# Patient Record
Sex: MALE | Race: BLACK OR AFRICAN AMERICAN | NOT HISPANIC OR LATINO | Employment: FULL TIME | URBAN - METROPOLITAN AREA
[De-identification: names, ages, dates, MRNs, and addresses within clinical notes are randomized per-mention and may not be internally consistent; named-entity substitution may affect disease eponyms.]

---

## 2022-12-14 DIAGNOSIS — Z00.00 ROUTINE GENERAL MEDICAL EXAMINATION AT A HEALTH CARE FACILITY: Primary | ICD-10-CM

## 2022-12-27 ENCOUNTER — HOSPITAL ENCOUNTER (OUTPATIENT)
Dept: RADIOLOGY | Facility: HOSPITAL | Age: 47
Discharge: HOME | End: 2022-12-27
Attending: INTERNAL MEDICINE
Payer: COMMERCIAL

## 2022-12-27 ENCOUNTER — CLINICAL SUPPORT (OUTPATIENT)
Dept: INTERNAL MEDICINE | Facility: CLINIC | Age: 47
End: 2022-12-27
Payer: COMMERCIAL

## 2022-12-27 ENCOUNTER — OFFICE VISIT (OUTPATIENT)
Dept: INTERNAL MEDICINE | Facility: CLINIC | Age: 47
End: 2022-12-27
Payer: COMMERCIAL

## 2022-12-27 ENCOUNTER — HOSPITAL ENCOUNTER (OUTPATIENT)
Dept: CARDIOLOGY | Facility: HOSPITAL | Age: 47
Discharge: HOME | End: 2022-12-27
Attending: INTERNAL MEDICINE
Payer: COMMERCIAL

## 2022-12-27 VITALS
SYSTOLIC BLOOD PRESSURE: 154 MMHG | HEART RATE: 67 BPM | WEIGHT: 206 LBS | HEIGHT: 68 IN | WEIGHT: 208 LBS | DIASTOLIC BLOOD PRESSURE: 97 MMHG | HEIGHT: 68 IN | BODY MASS INDEX: 31.52 KG/M2 | BODY MASS INDEX: 31.22 KG/M2

## 2022-12-27 DIAGNOSIS — Z00.00 ROUTINE GENERAL MEDICAL EXAMINATION AT A HEALTH CARE FACILITY: Primary | ICD-10-CM

## 2022-12-27 DIAGNOSIS — I10 ESSENTIAL HYPERTENSION: ICD-10-CM

## 2022-12-27 DIAGNOSIS — K44.9 HIATAL HERNIA WITH GERD: ICD-10-CM

## 2022-12-27 DIAGNOSIS — K21.9 HIATAL HERNIA WITH GERD: ICD-10-CM

## 2022-12-27 DIAGNOSIS — E78.5 HYPERLIPIDEMIA LDL GOAL <130: ICD-10-CM

## 2022-12-27 DIAGNOSIS — Z00.00 ROUTINE GENERAL MEDICAL EXAMINATION AT A HEALTH CARE FACILITY: ICD-10-CM

## 2022-12-27 DIAGNOSIS — R73.09 ELEVATED HEMOGLOBIN A1C: ICD-10-CM

## 2022-12-27 LAB
ALBUMIN SERPL BCP-MCNC: 3.9 G/DL (ref 3.5–5.2)
ALP SERPL-CCNC: 55 U/L (ref 55–135)
ALT SERPL W/O P-5'-P-CCNC: 40 U/L (ref 10–44)
ANION GAP SERPL CALC-SCNC: 13 MMOL/L (ref 8–16)
AST SERPL-CCNC: 38 U/L (ref 10–40)
BILIRUB SERPL-MCNC: 0.4 MG/DL (ref 0.1–1)
BUN SERPL-MCNC: 11 MG/DL (ref 6–20)
CALCIUM SERPL-MCNC: 9.5 MG/DL (ref 8.7–10.5)
CHLORIDE SERPL-SCNC: 107 MMOL/L (ref 95–110)
CHOLEST SERPL-MCNC: 191 MG/DL (ref 120–199)
CHOLEST/HDLC SERPL: 3.3 {RATIO} (ref 2–5)
CO2 SERPL-SCNC: 24 MMOL/L (ref 23–29)
COMPLEXED PSA SERPL-MCNC: 0.96 NG/ML (ref 0–4)
CREAT SERPL-MCNC: 0.9 MG/DL (ref 0.5–1.4)
CV STRESS BASE HR: 66 BPM
DIASTOLIC BLOOD PRESSURE: 80 MMHG
ERYTHROCYTE [DISTWIDTH] IN BLOOD BY AUTOMATED COUNT: 14.2 % (ref 11.5–14.5)
EST. GFR  (NO RACE VARIABLE): >60 ML/MIN/1.73 M^2
ESTIMATED AVG GLUCOSE: 123 MG/DL (ref 68–131)
GLUCOSE SERPL-MCNC: 102 MG/DL (ref 70–110)
HBA1C MFR BLD: 5.9 % (ref 4–5.6)
HCT VFR BLD AUTO: 42.9 % (ref 40–54)
HDLC SERPL-MCNC: 58 MG/DL (ref 40–75)
HDLC SERPL: 30.4 % (ref 20–50)
HGB BLD-MCNC: 13.3 G/DL (ref 14–18)
HIV 1+2 AB+HIV1 P24 AG SERPL QL IA: NORMAL
LDLC SERPL CALC-MCNC: 122.6 MG/DL (ref 63–159)
MCH RBC QN AUTO: 26.3 PG (ref 27–31)
MCHC RBC AUTO-ENTMCNC: 31 G/DL (ref 32–36)
MCV RBC AUTO: 85 FL (ref 82–98)
NONHDLC SERPL-MCNC: 133 MG/DL
OHS CV CPX 1 MINUTE RECOVERY HEART RATE: 137 BPM
OHS CV CPX 85 PERCENT MAX PREDICTED HEART RATE MALE: 149
OHS CV CPX ESTIMATED METS: 15
OHS CV CPX MAX PREDICTED HEART RATE: 175
OHS CV CPX PATIENT IS FEMALE: 0
OHS CV CPX PATIENT IS MALE: 1
OHS CV CPX PEAK DIASTOLIC BLOOD PRESSURE: 86 MMHG
OHS CV CPX PEAK HEAR RATE: 166 BPM
OHS CV CPX PEAK RATE PRESSURE PRODUCT: NORMAL
OHS CV CPX PEAK SYSTOLIC BLOOD PRESSURE: 193 MMHG
OHS CV CPX PERCENT MAX PREDICTED HEART RATE ACHIEVED: 95
OHS CV CPX RATE PRESSURE PRODUCT PRESENTING: 8844
PLATELET # BLD AUTO: 207 K/UL (ref 150–450)
PMV BLD AUTO: 10.3 FL (ref 9.2–12.9)
POTASSIUM SERPL-SCNC: 4 MMOL/L (ref 3.5–5.1)
PROT SERPL-MCNC: 7.3 G/DL (ref 6–8.4)
RBC # BLD AUTO: 5.05 M/UL (ref 4.6–6.2)
SODIUM SERPL-SCNC: 144 MMOL/L (ref 136–145)
STRESS ECHO POST EXERCISE DUR MIN: 8 MINUTES
STRESS ECHO POST EXERCISE DUR SEC: 52 SECONDS
SYSTOLIC BLOOD PRESSURE: 134 MMHG
TRIGL SERPL-MCNC: 52 MG/DL (ref 30–150)
TSH SERPL DL<=0.005 MIU/L-ACNC: 0.86 UIU/ML (ref 0.4–4)
WBC # BLD AUTO: 2.93 K/UL (ref 3.9–12.7)

## 2022-12-27 PROCEDURE — 4010F ACE/ARB THERAPY RXD/TAKEN: CPT | Mod: CPTII,S$GLB,, | Performed by: INTERNAL MEDICINE

## 2022-12-27 PROCEDURE — 80061 LIPID PANEL: CPT | Performed by: INTERNAL MEDICINE

## 2022-12-27 PROCEDURE — 99999 PR PBB SHADOW E&M-EST. PATIENT-LVL III: ICD-10-PCS | Mod: PBBFAC,,, | Performed by: INTERNAL MEDICINE

## 2022-12-27 PROCEDURE — 99999 PR PBB SHADOW E&M-EST. PATIENT-LVL I: CPT | Mod: PBBFAC,,,

## 2022-12-27 PROCEDURE — 3044F PR MOST RECENT HEMOGLOBIN A1C LEVEL <7.0%: ICD-10-PCS | Mod: CPTII,S$GLB,, | Performed by: INTERNAL MEDICINE

## 2022-12-27 PROCEDURE — 80053 COMPREHEN METABOLIC PANEL: CPT | Performed by: INTERNAL MEDICINE

## 2022-12-27 PROCEDURE — 99386 PREV VISIT NEW AGE 40-64: CPT | Mod: S$GLB,,, | Performed by: INTERNAL MEDICINE

## 2022-12-27 PROCEDURE — 99999 PR PBB SHADOW E&M-EST. PATIENT-LVL I: ICD-10-PCS | Mod: PBBFAC,,,

## 2022-12-27 PROCEDURE — 71046 X-RAY EXAM CHEST 2 VIEWS: CPT | Mod: 26,,, | Performed by: RADIOLOGY

## 2022-12-27 PROCEDURE — 84443 ASSAY THYROID STIM HORMONE: CPT | Performed by: INTERNAL MEDICINE

## 2022-12-27 PROCEDURE — 71046 XR CHEST PA AND LATERAL: ICD-10-PCS | Mod: 26,,, | Performed by: RADIOLOGY

## 2022-12-27 PROCEDURE — 99211 PR NURSE VISIT, 15 MINS, EXEC HLTH ONLY: ICD-10-PCS | Mod: S$GLB,,, | Performed by: INTERNAL MEDICINE

## 2022-12-27 PROCEDURE — 83036 HEMOGLOBIN GLYCOSYLATED A1C: CPT | Performed by: INTERNAL MEDICINE

## 2022-12-27 PROCEDURE — 93018 EXERCISE STRESS - EKG (CUPID ONLY): ICD-10-PCS | Mod: ,,, | Performed by: INTERNAL MEDICINE

## 2022-12-27 PROCEDURE — 3077F PR MOST RECENT SYSTOLIC BLOOD PRESSURE >= 140 MM HG: ICD-10-PCS | Mod: CPTII,S$GLB,, | Performed by: INTERNAL MEDICINE

## 2022-12-27 PROCEDURE — 3080F DIAST BP >= 90 MM HG: CPT | Mod: CPTII,S$GLB,, | Performed by: INTERNAL MEDICINE

## 2022-12-27 PROCEDURE — 97802 PR MED NUTR THER, 1ST, INDIV, EA 15 MIN: ICD-10-PCS | Mod: S$GLB,,, | Performed by: INTERNAL MEDICINE

## 2022-12-27 PROCEDURE — 97750 PR PHYSICAL PERFORMANCE TEST: ICD-10-PCS | Mod: S$GLB,,, | Performed by: INTERNAL MEDICINE

## 2022-12-27 PROCEDURE — 93016 EXERCISE STRESS - EKG (CUPID ONLY): ICD-10-PCS | Mod: ,,, | Performed by: INTERNAL MEDICINE

## 2022-12-27 PROCEDURE — 87389 HIV-1 AG W/HIV-1&-2 AB AG IA: CPT | Performed by: INTERNAL MEDICINE

## 2022-12-27 PROCEDURE — 71046 X-RAY EXAM CHEST 2 VIEWS: CPT | Mod: TC,FY

## 2022-12-27 PROCEDURE — 93018 CV STRESS TEST I&R ONLY: CPT | Mod: ,,, | Performed by: INTERNAL MEDICINE

## 2022-12-27 PROCEDURE — 1159F PR MEDICATION LIST DOCUMENTED IN MEDICAL RECORD: ICD-10-PCS | Mod: CPTII,S$GLB,, | Performed by: INTERNAL MEDICINE

## 2022-12-27 PROCEDURE — 84153 ASSAY OF PSA TOTAL: CPT | Performed by: INTERNAL MEDICINE

## 2022-12-27 PROCEDURE — 3044F HG A1C LEVEL LT 7.0%: CPT | Mod: CPTII,S$GLB,, | Performed by: INTERNAL MEDICINE

## 2022-12-27 PROCEDURE — 93016 CV STRESS TEST SUPVJ ONLY: CPT | Mod: ,,, | Performed by: INTERNAL MEDICINE

## 2022-12-27 PROCEDURE — 97802 MEDICAL NUTRITION INDIV IN: CPT | Mod: S$GLB,,, | Performed by: INTERNAL MEDICINE

## 2022-12-27 PROCEDURE — 99999 PR PBB SHADOW E&M-EST. PATIENT-LVL III: CPT | Mod: PBBFAC,,, | Performed by: INTERNAL MEDICINE

## 2022-12-27 PROCEDURE — 3077F SYST BP >= 140 MM HG: CPT | Mod: CPTII,S$GLB,, | Performed by: INTERNAL MEDICINE

## 2022-12-27 PROCEDURE — 99211 OFF/OP EST MAY X REQ PHY/QHP: CPT | Mod: S$GLB,,, | Performed by: INTERNAL MEDICINE

## 2022-12-27 PROCEDURE — 4010F PR ACE/ARB THEARPY RXD/TAKEN: ICD-10-PCS | Mod: CPTII,S$GLB,, | Performed by: INTERNAL MEDICINE

## 2022-12-27 PROCEDURE — 99386 PR PREVENTIVE VISIT,NEW,40-64: ICD-10-PCS | Mod: S$GLB,,, | Performed by: INTERNAL MEDICINE

## 2022-12-27 PROCEDURE — 3080F PR MOST RECENT DIASTOLIC BLOOD PRESSURE >= 90 MM HG: ICD-10-PCS | Mod: CPTII,S$GLB,, | Performed by: INTERNAL MEDICINE

## 2022-12-27 PROCEDURE — 85027 COMPLETE CBC AUTOMATED: CPT | Performed by: INTERNAL MEDICINE

## 2022-12-27 PROCEDURE — 1159F MED LIST DOCD IN RCRD: CPT | Mod: CPTII,S$GLB,, | Performed by: INTERNAL MEDICINE

## 2022-12-27 PROCEDURE — 97750 PHYSICAL PERFORMANCE TEST: CPT | Mod: S$GLB,,, | Performed by: INTERNAL MEDICINE

## 2022-12-27 PROCEDURE — 3008F PR BODY MASS INDEX (BMI) DOCUMENTED: ICD-10-PCS | Mod: CPTII,S$GLB,, | Performed by: INTERNAL MEDICINE

## 2022-12-27 PROCEDURE — 93017 CV STRESS TEST TRACING ONLY: CPT

## 2022-12-27 PROCEDURE — 3008F BODY MASS INDEX DOCD: CPT | Mod: CPTII,S$GLB,, | Performed by: INTERNAL MEDICINE

## 2022-12-27 RX ORDER — OMEPRAZOLE AND SODIUM BICARBONATE 40; 1100 MG/1; MG/1
1 CAPSULE ORAL DAILY
COMMUNITY
Start: 2012-08-25

## 2022-12-27 RX ORDER — LOSARTAN POTASSIUM 25 MG/1
25 TABLET ORAL DAILY
Qty: 90 TABLET | Refills: 3 | Status: SHIPPED | OUTPATIENT
Start: 2022-12-27 | End: 2024-03-05

## 2022-12-27 RX ORDER — MULTIVITAMIN
1 TABLET ORAL DAILY
COMMUNITY

## 2022-12-27 RX ORDER — LEVOCETIRIZINE DIHYDROCHLORIDE 5 MG/1
5 TABLET, FILM COATED ORAL
COMMUNITY
Start: 2022-04-25 | End: 2023-12-21

## 2022-12-27 RX ORDER — CHOLECALCIFEROL (VITAMIN D3) 25 MCG
2000 TABLET ORAL
COMMUNITY

## 2022-12-27 NOTE — PROGRESS NOTES
"Nutrition Assessment  Session Time:  60 minutes      Client name:  Amador Brown  :  1975  Age:  46 y.o.  Gender:  male    Client states:  Very pleasant gentleman here for his initial Executive Health physical.   with two sons, ages 19 and 13.  Resides in [place] although awaiting new home to be completed.  Has a stated hx of hiatal hernia, HTN, and GERD in addition to FH of DM (father), pancreatic CA (mother), HTN, and stroke.  Dines out often due to nature of job and children's extracurricular activities.  Limits intake of sugary beverages, drinking mostly coffee, water, and unsweet tea.  Weight typically fluctuates +/- 10# and is slightly higher today due to holiday indulgences.  Maintains an active lifestyle, performing 2-5 miles on treadmill almost daily.  Admits, however, that frequency has been somewhat inconsistent at times due to temporary living situation (apartment).  With job, this is his fifth move and finds it difficult to maintain health habits during such interim periods.  Typically consumes three meals daily although inquired about healthy snacking as he finds himself grazing in the afternoons, sometimes in lieu of eating dinner.  Selects mostly lean protein sources.  Is aware of dietary triggers for GERD and so, limits intake of acidic foods in particular.  Was previously advised to consume 6 small meals/snacks daily to ease symptoms although unsure of practicality of such.  Overall, desires to improve HbA1c, body weight, P.A. frequency, and snacking.        Anthropometrics  Height:  5' 8"     Weight:  206.9#  BMI:  31.5  % Body Fat:  31.9%    Clinical Signs/Symptoms  N/V/D:  None  Appetite:  good       Past Medical History:   Diagnosis Date    GERD (gastroesophageal reflux disease)     Hiatal hernia with GERD        Past Surgical History:   Procedure Laterality Date    KNEE ARTHROPLASTY Left      - torn cartilage    SINUS SURGERY Bilateral        Medications    has a current " medication list which includes the following prescription(s): levocetirizine, losartan, multivitamin, omeprazole-sodium bicarbonate, and vitamin d.    Vitamins, Minerals, and/or Supplements:  Vitamin D, MVI     Food/Medication Interactions:  Reviewed     Food Allergies or Intolerances:  Eggs although can tolerate intake     Social History    Marital status:    Employment:  [employer]    Social History     Tobacco Use    Smoking status: Not on file    Smokeless tobacco: Not on file   Substance Use Topics    Alcohol use: Not on file        Lab Reports   Sodium   Date Value Ref Range Status   12/27/2022 144 136 - 145 mmol/L Final     Potassium   Date Value Ref Range Status   12/27/2022 4.0 3.5 - 5.1 mmol/L Final     Chloride   Date Value Ref Range Status   12/27/2022 107 95 - 110 mmol/L Final     CO2   Date Value Ref Range Status   12/27/2022 24 23 - 29 mmol/L Final     Glucose   Date Value Ref Range Status   12/27/2022 102 70 - 110 mg/dL Final     BUN   Date Value Ref Range Status   12/27/2022 11 6 - 20 mg/dL Final     Creatinine   Date Value Ref Range Status   12/27/2022 0.9 0.5 - 1.4 mg/dL Final     Calcium   Date Value Ref Range Status   12/27/2022 9.5 8.7 - 10.5 mg/dL Final     Total Protein   Date Value Ref Range Status   12/27/2022 7.3 6.0 - 8.4 g/dL Final     Albumin   Date Value Ref Range Status   12/27/2022 3.9 3.5 - 5.2 g/dL Final     Total Bilirubin   Date Value Ref Range Status   12/27/2022 0.4 0.1 - 1.0 mg/dL Final     Comment:     For infants and newborns, interpretation of results should be based  on gestational age, weight and in agreement with clinical  observations.    Premature Infant recommended reference ranges:  Up to 24 hours.............<8.0 mg/dL  Up to 48 hours............<12.0 mg/dL  3-5 days..................<15.0 mg/dL  6-29 days.................<15.0 mg/dL       Alkaline Phosphatase   Date Value Ref Range Status   12/27/2022 55 55 - 135 U/L Final     AST   Date Value Ref Range Status    12/27/2022 38 10 - 40 U/L Final     ALT   Date Value Ref Range Status   12/27/2022 40 10 - 44 U/L Final     Anion Gap   Date Value Ref Range Status   12/27/2022 13 8 - 16 mmol/L Final     eGFR    Date Value Ref Range Status   08/10/2022 >90 >90 mL/min/1.73m2 Final     eGFR   Date Value Ref Range Status   08/10/2022 >90 >90 mL/min/1.73m2 Final      Lab Results   Component Value Date    WBC 2.93 (L) 12/27/2022    HGB 13.3 (L) 12/27/2022    HCT 42.9 12/27/2022    MCV 85 12/27/2022     12/27/2022        Lab Results   Component Value Date    CHOL 191 12/27/2022     Lab Results   Component Value Date    HDL 58 12/27/2022     Lab Results   Component Value Date    LDLCALC 122.6 12/27/2022     Lab Results   Component Value Date    TRIG 52 12/27/2022     Lab Results   Component Value Date    CHOLHDL 30.4 12/27/2022     Lab Results   Component Value Date    HGBA1C 5.9 (H) 12/27/2022     BP Readings from Last 1 Encounters:   12/27/22 (!) 154/97       Food History  Breakfast:  Oatmeal/cereal/[employer] egg bites  Mid-morning Snack:  None  Lunch:  Restaurant meal  Mid-afternoon Snack:  Skinny pop popcorn + cheese or dark chocolate/guacamole + chips  Dinner:  Restaurant meal/snacks  Snack:  None  *Fluid intake:  Coffee, water, unsweet tea    Exercise History:  2-5 miles treadmill ~4-5x/week    Cultural/Spiritual/Personal Preferences:  None identified    Support System:  spouse and children    State of Change:  Contemplation    Barriers to Change:  None    Diagnosis    Altered nutrition related laboratory values related to weight gain and imbalanced meals as evidenced by HbA1c:  5.9%.    Intervention    RMR (Method:  InBody):  1750 kcal  Activity Factor:  1.3    COLE:  2275 - 500 = 1775 kcal    Goals:  1.  Achieve 10# weight loss  2.  Resume 5 small meals/snacks   3.  Incorporate a source of lean protein with snacks  4.  Request double order of non-starchy vegetables when dining out  5.  Maintain > 150  minutes of physical activity/week as tolerated, varying mode of activity for enhanced HR and calorie burn  6.  HbA1c < 5.7%    Nutrition Education  The following education was provided to the patient:  Complimented patient on proactive role in health maintenance.  Complimented patient on physical activity efforts.  Discussed meal planning/[employer] My Plate design.  Discussed healthy snacking.   Discussed weight management.  Discussed Heart Healthy Eating.  Discussed GERD Nutrition Therapy.  Suggested dietary modifications based on current dietary behaviors and individual food preferences.  Discussed macronutrient distribution among meals and snacks, including CHO, protein, and fat recommendations and its importance/benefits.  Discussed physical activity guidelines and its associated benefits.  Discussed tips for eating healthy when dining out.  Discussed nutrition-related lab values and dietary and/or lifestyle factors affecting them.  Discussed importance of small behavior/habit changes in improving long-term adherence and sustainability.  Discussed goal setting.  Provided ongoing support, encouragement, and guidance toward improved health efforts.    Patient verbalized understanding of nutrition education and recommendations received.    Handouts Provided  Meal Planning Guide  Restaurant Guide  Eat Fit Shopping List  Eat Fit Alexia  Fueling Well On-The-Go    Monitoring/Evaluation    Monitor the following:  Weight  BMI  % Body Fat  Caloric intake  HbA1c  BP  CBC    Follow Up Plan:  Communication with referring healthcare provider is unnecessary at this time as patient presented as part of annual wellness exam.  However, will follow up with patient in 1-2 years.

## 2022-12-27 NOTE — PROGRESS NOTES
Subjective:       Patient ID: Amador Brown is a 46 y.o. male.    Chief Complaint: hospitals Care/executive health first visit     46-year-old nonsmoking gentleman who works for [employer] is here for his 1st executive health physical.  Recently got diagnosed with hypertension is on 12.5 mg of losartan it did not take it today due to needing a fasting state.  He also was placed on atorvastatin 40 mg but his baseline cholesterol is about 200.  He exercises regularly and does watch his diet.  He also has a history of GERD with a significant hiatal hernia.  He follows with a gastroenterologist on the [facility] and has had more than 1 EGD and a colonoscopy at the end of 2019.  He is on Zegerid for acid suppression.  Today had a CMP, CBC, lipid profile, TSH, hemoglobin A1c and PSA and all labs looked within normal limits.  His hemoglobin A1c however was mildly elevated at 5.9% with a normal fasting blood glucose.  His total cholesterol was 191 with an LDL of 122 and a total cholesterol to good cholesterol ratio of  3.3.  He also has a leukopenia which she follows yearly with Hematology.  Today his white blood cell count is 2.93.  This is on the higher side for his average.  He did an exercise stress test that was negative any achieved above average for his age and exercise and 15 Mets.  He also had a chest x-ray today that was unremarkable.    Review of Systems   Constitutional:  Negative for activity change, diaphoresis, fatigue and fever.   HENT:  Negative for nasal congestion, ear pain, postnasal drip, sinus pressure/congestion, sore throat and trouble swallowing.    Eyes:  Negative for pain.   Respiratory:  Negative for cough, chest tightness, shortness of breath and wheezing.    Cardiovascular:  Negative for chest pain, palpitations and leg swelling.   Gastrointestinal:  Negative for abdominal distention, abdominal pain, blood in stool, constipation and diarrhea.   Endocrine: Negative for cold intolerance and heat  intolerance.   Genitourinary:  Negative for decreased urine volume, difficulty urinating, dysuria, flank pain, frequency and hematuria.   Musculoskeletal:  Negative for arthralgias, back pain, joint swelling, myalgias and neck pain.   Integumentary:  Negative for pallor, rash and wound.   Neurological:  Negative for tremors, syncope, weakness, light-headedness, numbness and headaches.   Hematological:  Negative for adenopathy.   Psychiatric/Behavioral:  Negative for confusion, decreased concentration, hallucinations, self-injury, sleep disturbance and suicidal ideas. The patient is not nervous/anxious.        Objective:      Physical Exam  Constitutional:       General: He is not in acute distress.     Appearance: Normal appearance.   HENT:      Head: Normocephalic and atraumatic.   Eyes:      General: No scleral icterus.     Conjunctiva/sclera: Conjunctivae normal.   Neck:      Vascular: No carotid bruit.   Cardiovascular:      Rate and Rhythm: Normal rate and regular rhythm.      Heart sounds: Normal heart sounds.   Pulmonary:      Effort: Pulmonary effort is normal.      Breath sounds: Normal breath sounds.   Abdominal:      Palpations: Abdomen is soft.   Musculoskeletal:         General: Normal range of motion.      Cervical back: Normal range of motion and neck supple.   Skin:     General: Skin is warm and dry.   Neurological:      General: No focal deficit present.      Mental Status: He is alert.   Psychiatric:         Mood and Affect: Mood normal.         Behavior: Behavior normal.         Thought Content: Thought content normal.         Judgment: Judgment normal.       Assessment/plan       Routine general medical examination at a health care facility     Health Maintenance   Topic Date Due    TETANUS VACCINE  Never done- can get at local pharmacy or next year in  . It is once every 10 year vaccine     Lipid Panel  12/27/2027    Hepatitis C Screening  Completed     Health Maintenance  Reviewed    Colonoscopy done in 2019/2020 before pandemic with Dr Swann - pt reports no polyps    Hyperlipidemia LDL goal <130  Comments:  continue to watch diet and exercise as you are doing     Essential hypertension  Comments:  would increase losartan to 25 mg daily.  And would start monitoring her blood pressure in about 2 weeks.  Goal blood [id number] systolic over 70-80 diastolic.  Orders:  -     losartan (COZAAR) 25 MG tablet; Take 1 tablet (25 mg total) by mouth once daily.  Dispense: 90 tablet; Refill: 3  CELINE, if you cannot get in with your PCP in a month to follow up on blood pressure. We can do an audio visit to discuss your readings     Hiatal hernia with GERD   Continue with Zegerid daily     Elevated hemoglobin A1c- 5.9 %   Comments:  with a family history of type 2 diabetes in his dad starting in his 40's  watch concentrated sugars as you are doing and would recehck Aic with PCP biyearly

## 2022-12-27 NOTE — PROGRESS NOTES
"""IOP OU: stable with current drop therapy.  CPM"" Subjective:       Patient ID: Amador Brown is a 46 y.o. male.    Chief Complaint: No chief complaint on file.    HPI    History: Pt. Has no significant cardiovascular or pulmonary history    Physical Limitations: Pt had none to report. Pt . Also suffers from Acid reflux and physician recommended not to perform any core exercises while laying on the ground.  As a result, the curl up test was deferred.     Current exercise routine: pt runs 2 miles 4-5 times / week with no strength exercises    Goals:  To increase muscular strength      Review of Systems      Objective:      D.O.S. 12/27/2022   Height (in): 68   Weight (lbs): 206.9   BMI: 31.752675   Body Fat (%): 31.90   Waist (cm): 89   RBP (mmHg): 140/98   RHR (bpm): 60    Strength Dominant (Lbs): 105    Strength Non Dominant (Lbs): 90   Push-up Assessment: 20   Curl-up Assessment: Deffered    Flexibility Testing (cm): 34   REE (kcals): 1750     Physical Exam    Assessment:       Resting BP: Within Normal Limits   Body Fat %: Poor   Waist Circumfernece: Low Risk    Strength Dominant: 50th    Strength Non Dominant: 50th   Upper Body Endurance: Very Good   Abdominal Endurance: Deferred   Lower body Flexibiltiy: Very Good     Problem List Items Addressed This Visit    None  Visit Diagnoses       Routine general medical examination at a health care facility    -  Primary              Plan:     Perform 30-60min/day 5x/wk (>150min/wk) of moderate (129-140 bpm) intensity exercise. More vigorous aerobics (140>bpm) can maintain or improve cardiovascular health with at least 75min/wk.  Recommended to begin/continue regular aerobic training routine in order to meet above guidelines.    Perform 2-4 sets of 10-20 repetitions at moderate intensity 2-4x/wk. for each muscle group.  Recommended to begin/continue resistance training program to maintain or increase muscular strength and endurance.    Perform 2-4 stretches for 30s for each muscle group daily for best  results.  Daily stretching should be performed in order to maintain or increase current level of flexibility.    Patient currently running 2 miles 5x/week.  Will be begin to incorprate strength exercercises at least twice a week. Due to the pt acid reflux, other core exercises will be provided

## 2023-11-28 DIAGNOSIS — Z00.00 ROUTINE MEDICAL EXAM: Primary | ICD-10-CM

## 2023-12-21 ENCOUNTER — CLINICAL SUPPORT (OUTPATIENT)
Dept: INTERNAL MEDICINE | Facility: CLINIC | Age: 48
End: 2023-12-21

## 2023-12-21 ENCOUNTER — OFFICE VISIT (OUTPATIENT)
Dept: INTERNAL MEDICINE | Facility: CLINIC | Age: 48
End: 2023-12-21

## 2023-12-21 ENCOUNTER — HOSPITAL ENCOUNTER (OUTPATIENT)
Dept: CARDIOLOGY | Facility: CLINIC | Age: 48
Discharge: HOME | End: 2023-12-21

## 2023-12-21 VITALS
BODY MASS INDEX: 31.07 KG/M2 | WEIGHT: 205 LBS | HEART RATE: 58 BPM | SYSTOLIC BLOOD PRESSURE: 138 MMHG | HEIGHT: 68 IN | DIASTOLIC BLOOD PRESSURE: 91 MMHG

## 2023-12-21 DIAGNOSIS — E66.9 CLASS 1 OBESITY WITH BODY MASS INDEX (BMI) OF 31.0 TO 31.9 IN ADULT, UNSPECIFIED OBESITY TYPE, UNSPECIFIED WHETHER SERIOUS COMORBIDITY PRESENT: ICD-10-CM

## 2023-12-21 DIAGNOSIS — Z00.00 ROUTINE MEDICAL EXAM: ICD-10-CM

## 2023-12-21 DIAGNOSIS — Z00.00 HEALTHCARE MAINTENANCE: ICD-10-CM

## 2023-12-21 DIAGNOSIS — Z00.00 ANNUAL PHYSICAL EXAM: Primary | ICD-10-CM

## 2023-12-21 DIAGNOSIS — Z00.00 ROUTINE GENERAL MEDICAL EXAMINATION AT A HEALTH CARE FACILITY: Primary | ICD-10-CM

## 2023-12-21 DIAGNOSIS — R10.31 PAIN IN THE GROIN, RIGHT: ICD-10-CM

## 2023-12-21 DIAGNOSIS — E78.5 HYPERLIPIDEMIA, UNSPECIFIED HYPERLIPIDEMIA TYPE: ICD-10-CM

## 2023-12-21 DIAGNOSIS — D72.819 LEUKOPENIA, UNSPECIFIED TYPE: ICD-10-CM

## 2023-12-21 LAB
ALBUMIN SERPL BCP-MCNC: 4.1 G/DL (ref 3.5–5.2)
ALP SERPL-CCNC: 47 U/L (ref 55–135)
ALT SERPL W/O P-5'-P-CCNC: 31 U/L (ref 10–44)
ANION GAP SERPL CALC-SCNC: 9 MMOL/L (ref 8–16)
AST SERPL-CCNC: 33 U/L (ref 10–40)
BILIRUB SERPL-MCNC: 0.6 MG/DL (ref 0.1–1)
BUN SERPL-MCNC: 14 MG/DL (ref 6–20)
CALCIUM SERPL-MCNC: 10 MG/DL (ref 8.7–10.5)
CHLORIDE SERPL-SCNC: 107 MMOL/L (ref 95–110)
CHOLEST SERPL-MCNC: 214 MG/DL (ref 120–199)
CHOLEST/HDLC SERPL: 4.1 {RATIO} (ref 2–5)
CO2 SERPL-SCNC: 28 MMOL/L (ref 23–29)
CREAT SERPL-MCNC: 1 MG/DL (ref 0.5–1.4)
ERYTHROCYTE [DISTWIDTH] IN BLOOD BY AUTOMATED COUNT: 14.1 % (ref 11.5–14.5)
EST. GFR  (NO RACE VARIABLE): >60 ML/MIN/1.73 M^2
ESTIMATED AVG GLUCOSE: 123 MG/DL (ref 68–131)
GLUCOSE SERPL-MCNC: 98 MG/DL (ref 70–110)
HBA1C MFR BLD: 5.9 % (ref 4–5.6)
HCT VFR BLD AUTO: 43.6 % (ref 40–54)
HCV AB SERPL QL IA: NORMAL
HDLC SERPL-MCNC: 52 MG/DL (ref 40–75)
HDLC SERPL: 24.3 % (ref 20–50)
HGB BLD-MCNC: 14.3 G/DL (ref 14–18)
LDLC SERPL CALC-MCNC: 141.8 MG/DL (ref 63–159)
MCH RBC QN AUTO: 28.1 PG (ref 27–31)
MCHC RBC AUTO-ENTMCNC: 32.8 G/DL (ref 32–36)
MCV RBC AUTO: 86 FL (ref 82–98)
NONHDLC SERPL-MCNC: 162 MG/DL
PLATELET # BLD AUTO: 223 K/UL (ref 150–450)
PMV BLD AUTO: 10.4 FL (ref 9.2–12.9)
POTASSIUM SERPL-SCNC: 4.5 MMOL/L (ref 3.5–5.1)
PROT SERPL-MCNC: 7.8 G/DL (ref 6–8.4)
RBC # BLD AUTO: 5.08 M/UL (ref 4.6–6.2)
SODIUM SERPL-SCNC: 144 MMOL/L (ref 136–145)
TRIGL SERPL-MCNC: 101 MG/DL (ref 30–150)
TSH SERPL DL<=0.005 MIU/L-ACNC: 0.55 UIU/ML (ref 0.4–4)
WBC # BLD AUTO: 2.58 K/UL (ref 3.9–12.7)

## 2023-12-21 PROCEDURE — 97750 PR PHYSICAL PERFORMANCE TEST: ICD-10-PCS | Mod: S$GLB,,, | Performed by: INTERNAL MEDICINE

## 2023-12-21 PROCEDURE — 93010 EKG 12-LEAD: ICD-10-PCS | Mod: S$GLB,,, | Performed by: INTERNAL MEDICINE

## 2023-12-21 PROCEDURE — 80053 COMPREHEN METABOLIC PANEL: CPT | Performed by: EMERGENCY MEDICINE

## 2023-12-21 PROCEDURE — 99999 PR PBB SHADOW E&M-EST. PATIENT-LVL I: CPT | Mod: PBBFAC,,,

## 2023-12-21 PROCEDURE — 99999 PR PBB SHADOW E&M-EST. PATIENT-LVL I: ICD-10-PCS | Mod: PBBFAC,,,

## 2023-12-21 PROCEDURE — 99199 UNLISTED SPECIAL SVC PX/RPRT: CPT | Mod: ,,, | Performed by: INTERNAL MEDICINE

## 2023-12-21 PROCEDURE — 99396 PREV VISIT EST AGE 40-64: CPT | Mod: S$GLB,,, | Performed by: EMERGENCY MEDICINE

## 2023-12-21 PROCEDURE — 93005 EKG 12-LEAD: ICD-10-PCS | Mod: S$GLB,,, | Performed by: EMERGENCY MEDICINE

## 2023-12-21 PROCEDURE — 99999 PR PBB SHADOW E&M-EST. PATIENT-LVL III: ICD-10-PCS | Mod: PBBFAC,,, | Performed by: EMERGENCY MEDICINE

## 2023-12-21 PROCEDURE — 97750 PHYSICAL PERFORMANCE TEST: CPT | Mod: S$GLB,,, | Performed by: INTERNAL MEDICINE

## 2023-12-21 PROCEDURE — 93005 ELECTROCARDIOGRAM TRACING: CPT | Mod: S$GLB,,, | Performed by: EMERGENCY MEDICINE

## 2023-12-21 PROCEDURE — 90471 TDAP VACCINE GREATER THAN OR EQUAL TO 7YO IM: ICD-10-PCS | Mod: S$GLB,,, | Performed by: EMERGENCY MEDICINE

## 2023-12-21 PROCEDURE — 90715 TDAP VACCINE 7 YRS/> IM: CPT | Mod: S$GLB,,, | Performed by: EMERGENCY MEDICINE

## 2023-12-21 PROCEDURE — 80061 LIPID PANEL: CPT | Performed by: EMERGENCY MEDICINE

## 2023-12-21 PROCEDURE — 85027 COMPLETE CBC AUTOMATED: CPT | Performed by: EMERGENCY MEDICINE

## 2023-12-21 PROCEDURE — 97802 MEDICAL NUTRITION INDIV IN: CPT | Mod: S$GLB,,, | Performed by: DIETITIAN, REGISTERED

## 2023-12-21 PROCEDURE — 93010 ELECTROCARDIOGRAM REPORT: CPT | Mod: S$GLB,,, | Performed by: INTERNAL MEDICINE

## 2023-12-21 PROCEDURE — 99396 PR PREVENTIVE VISIT,EST,40-64: ICD-10-PCS | Mod: S$GLB,,, | Performed by: EMERGENCY MEDICINE

## 2023-12-21 PROCEDURE — 90471 IMMUNIZATION ADMIN: CPT | Mod: S$GLB,,, | Performed by: EMERGENCY MEDICINE

## 2023-12-21 PROCEDURE — 97802 PR MED NUTR THER, 1ST, INDIV, EA 15 MIN: ICD-10-PCS | Mod: S$GLB,,, | Performed by: DIETITIAN, REGISTERED

## 2023-12-21 PROCEDURE — 99211 OFF/OP EST MAY X REQ PHY/QHP: CPT | Mod: S$GLB,,, | Performed by: INTERNAL MEDICINE

## 2023-12-21 PROCEDURE — 99199 PR SPECIAL SERVICE/PROC/REPORT: ICD-10-PCS | Mod: ,,, | Performed by: INTERNAL MEDICINE

## 2023-12-21 PROCEDURE — 99999 PR PBB SHADOW E&M-EST. PATIENT-LVL III: CPT | Mod: PBBFAC,,, | Performed by: EMERGENCY MEDICINE

## 2023-12-21 PROCEDURE — 84443 ASSAY THYROID STIM HORMONE: CPT | Performed by: EMERGENCY MEDICINE

## 2023-12-21 PROCEDURE — 90715 TDAP VACCINE GREATER THAN OR EQUAL TO 7YO IM: ICD-10-PCS | Mod: S$GLB,,, | Performed by: EMERGENCY MEDICINE

## 2023-12-21 PROCEDURE — 99211 PR NURSE VISIT, 15 MINS, EXEC HLTH ONLY: ICD-10-PCS | Mod: S$GLB,,, | Performed by: INTERNAL MEDICINE

## 2023-12-21 PROCEDURE — 86803 HEPATITIS C AB TEST: CPT | Performed by: EMERGENCY MEDICINE

## 2023-12-21 PROCEDURE — 83036 HEMOGLOBIN GLYCOSYLATED A1C: CPT | Performed by: EMERGENCY MEDICINE

## 2023-12-21 RX ORDER — ZINC GLUCONATE 50 MG
50 TABLET ORAL
COMMUNITY

## 2023-12-21 NOTE — PROGRESS NOTES
[facility]      TODAY'S Date 12/21/2023  Patient ID: Amador Brown is a 47 y.o. male   MRN: [id number]  Primary Physician: Unable, To Obtain    SUBJECTIVE     Chief Complaint:   Chief Complaint   Patient presents with    [facility]     HPI:   Reviewed medical, surgical, social and family history, medications, appropriate preventive health screenings, as well as vaccination history. Updates as noted below or in assessment and plan.    This is a very pleasant 47 y.o. nonsmoking male with PMHx GERD on omeprazole, Hiatal hernia who is new patient to me presenting for an annual exam in [facility].  Patient states that he is in normal state of health.  He is currently in a restful state as he has completed building his new home in [place].  His goal for 2024 is not have anymore house issues and to see his son graduate.  Patient shares 2 children ages 20 and 14 with his wife of 20 years.  He is grateful for his wife's health as she recently recovered from a traumatic brain injury dx in January 2023.  For exercise and self-care, patient tends to run 20 miles a week and 20 pushups.  He reports that for breakfast he has oatmeal with almond milk.  For lunch, patient has salmon and broccoli.  For dinner, patient will have sushi, seafood with salad, chicken and vegetables.  He tends to eat oatmeal bars, yogurt or ice cream for snacks.  Patient will have 1 cup of coffee in the morning.  He tends to drink half a glass of wine weekly and 2-3 bourbons or vodka on the weekend.  He has no problem falling asleep at 9:00 p.m. and tends to wake up rested at 4 or 5 in the morning.  His wife has reported that he does snore.  Patient reports that he enjoys his job at [email or web address] and .  Patient reports that he was raised [facility] and he expresses his mello by giving out his personal time.  In the new year, he wishes to do so more. He is open to receiving  "the influenza vaccine at local pharmacy but declines the COVID booster at this time. There is no report of any mood changes, arthralgias, myalgias, cough, sneezing, tingling, weakness, clumsiness, numbness or loss of bowel or bladder control.    SCREENING:   Prostate:                    UTD, He understands PSA is a screening blood test. We discussed about the pros and cons of PSA testing for prostate cancer screening   Colonoscopy:             2019 with Dr. Swann, next due 2024   Depression:                negative   Anxiety:  negative  Eye Exam:                Last visit 1.5 years  Dental Exam:            Routine last visit 4 months  Skin:                         routinely sunscreen  Sex:                          Monogamous relationship, contraception by BTL in partner  Transportation safety:  Uses restraint consistently, does not drink alcohol before or while driving  Firearm safety: Does not own a gun  Tobacco use:            none    A review of medical records indicates by Bhargav Crowell MD of Hem/Onc: "He was initially evaluated for this issue in December of 2020 by Dr. Weathers. He presented with chronic neutropenia having 40% granulocytosis, 40% lymphocytosis, and 9% monocytosis. His initial work-up showed a negative BYRON titer, negative rheumatoid factor, normal TSH, HIV non-reactive, Hepatitis B and C both non-reactive, normal B12 and Folate, Flow cytometry was negative for any monoclonal population. A peripheral smear review confirmed leukopenia with absolute neutropenia. WBC morphology was unremarkable and his RBC and plt were adequate. An ultrasound of the abdomen was normal. "     Immunization History   Administered Date(s) Administered    COVID-19, MRNA, LN-S, PF (Pfizer) (Purple Cap) 03/09/2021, 03/30/2021, 11/09/2021, 11/10/2021    Tdap 12/21/2023     Past Medical History:   Diagnosis Date    GERD (gastroesophageal reflux disease)     Hiatal hernia with GERD      Past Surgical History:   Procedure " Laterality Date    KNEE ARTHROPLASTY Left     1990 - torn cartilage    SINUS SURGERY Bilateral 2005     Family History   Problem Relation Age of Onset    Diabetes Mother     Cancer Mother 46        pancreas    Diabetes Father     Ovarian cancer Maternal Grandmother     Hypertension Maternal Grandmother     Cancer Maternal Grandmother 52        ovarian    Heart disease Maternal Grandfather 55        heart attack    Colon cancer Maternal Grandfather     Cancer Maternal Uncle         great uncle /prostate and 1 with colon     Social History     Tobacco Use    Smoking status: Never    Smokeless tobacco: Never   Substance Use Topics    Alcohol use: Yes    Drug use: Not Currently     Past Medical, Surgical and Social history reviewed and verified by me.     Review of patient's allergies indicates:   Allergen Reactions    Eggs [egg derived]        Current Outpatient Medications on File Prior to Visit   Medication Sig Dispense Refill    levocetirizine (XYZAL) 5 MG tablet Take 5 mg by mouth.      losartan (COZAAR) 25 MG tablet Take 1 tablet (25 mg total) by mouth once daily. 90 tablet 3    multivitamin (THERAGRAN) per tablet Take 1 tablet by mouth once daily.      omeprazole-sodium bicarbonate (ZEGERID) 40-1.1 mg-gram per capsule Take 1 capsule by mouth once daily.      vitamin D (VITAMIN D3) 1000 units Tab Take 2,000 Units by mouth.      zinc gluconate 50 mg tablet Take 50 mg by mouth.       No current facility-administered medications on file prior to visit.       Review of Systems as per HPI  Review of Systems   Gastrointestinal:  Negative for abdominal pain, blood in stool, constipation, diarrhea, melena, nausea and vomiting.   Genitourinary:  Negative for dysuria, flank pain, frequency, hematuria and urgency.        Patient report intermittent R groin discomfort that is not present currently   Musculoskeletal:  Negative for back pain, falls, joint pain, myalgias and neck pain.     Constitutional: Negative for activity  "change, appetite change, fatigue, diaphoresis, chills and fever.   Respiratory: Negative for apnea, choking, chest tightness and wheezing.  Genitourinary: Negative for hematuria, flank pain, frequency and dysuria.   Skin: Negative for color change, pallor, rash and wound.   Psychiatric/Behavioral: Negative for agitation, behavioral problems, confusion, decreased concentration and dysphoric mood.     All other systems reviewed and are negative.    OBJECTIVE     PHYSICAL EXAM  Vitals:    12/21/23 0936 12/21/23 0937   BP: (!) 146/99 (!) 138/91   Pulse: (!) 58    Weight: 93 kg (205 lb 0.4 oz)    Height: 5' 8" (1.727 m)      Vital Signs (Most Recent):  Pulse: (!) 58 (12/21/23 0936)  BP: (!) 138/91 (12/21/23 0937)   Weight:   Wt Readings from Last 1 Encounters:   12/21/23 93 kg (205 lb 0.4 oz)     Body mass index is 31.17 kg/m².    Vital signs and nursing assessment noted: mildly elevated BP, bradycardia    GEN:   NAD, A & Ox3, atraumatic, well appearing, nontoxic appearing  HEENT:  PERRLA, EOMI, moist membranes, nl conjunctiva, no scleral icterus, no nystagmus, no nodes/nodules, soft, supple, FROM, no trachial deviation, nexus negative  CV:   RRR (on my auscultation), no m/r/g, 2+ radial pulses, <2sec cap refill, no obvious JVD  RESP:  CTA B, no w/r/r, equal and bilateral chest rise, no respiratory distress  ABD:   soft, Nontender, Nondistended, +BS, no guarding/rebound  Physical Exam  Abdominal:      Hernia: There is no hernia in the left inguinal area, right femoral area, left femoral area or right inguinal area.   Genitourinary:     Pubic Area: No rash or pubic lice.    Lymphadenopathy:      Lower Body: No right inguinal adenopathy. No left inguinal adenopathy.       BACK:  FROM, no midline tenderness, no paraspinal tenderness  EXT:   FROM, CORRIGAN x 4, no swelling, no edema, no calf tenderness, no bony tenderness, no warmth or redness, no crepitus, no obvious deformity  NEURO:  GCS 15, CN II-XII grossly intact, no " obvious motor/sensory deficit, no tremor, negative Romberg,  nl gait/coordination  PSYCH:   Cooperative, no SI/HI, no anxiety, nl mood/affect, nl judgement/thought process  SKIN:  Warm, dry, intact, no rashes/lesions or masses, nl color, no pallor      Tests    EKG  Reviewed and independently interpreted:  No STEMI  NSR with HR 55  Nl conduction, nl intervals  Nl ST and T wave   No ectopy, Nl axis      Labs reviewed and independently interpreted. Imaging studies reviewed.   Recent Results (from the past 2016 hour(s))   IRON AND TOTAL IRON BINDING CAPACITY, SERUM    Collection Time: 11/06/23  8:06 AM   Result Value Ref Range    Iron 42 (L) 50 - 175 ug/dL    TIBC 296 250 - 425 ug/dL    Iron Saturation 14 (L) 15 - 20 %   FERRITIN    Collection Time: 11/06/23  8:06 AM   Result Value Ref Range    Ferritin 115.2 10.5 - 307.3 ng/mL   LACTATE DEHYDROGENASE    Collection Time: 11/06/23  8:06 AM   Result Value Ref Range    Lactate Dehydrogenase 168 120 - 246 IU/L   Comprehensive metabolic panel    Collection Time: 12/21/23  9:03 AM   Result Value Ref Range    Sodium 144 136 - 145 mmol/L    Potassium 4.5 3.5 - 5.1 mmol/L    Chloride 107 95 - 110 mmol/L    CO2 28 23 - 29 mmol/L    Glucose 98 70 - 110 mg/dL    BUN 14 6 - 20 mg/dL    Creatinine 1.0 0.5 - 1.4 mg/dL    Calcium 10.0 8.7 - 10.5 mg/dL    Total Protein 7.8 6.0 - 8.4 g/dL    Albumin 4.1 3.5 - 5.2 g/dL    Total Bilirubin 0.6 0.1 - 1.0 mg/dL    Alkaline Phosphatase 47 (L) 55 - 135 U/L    AST 33 10 - 40 U/L    ALT 31 10 - 44 U/L    eGFR >60.0 >60 mL/min/1.73 m^2    Anion Gap 9 8 - 16 mmol/L   CBC Without Differential    Collection Time: 12/21/23  9:03 AM   Result Value Ref Range    WBC 2.58 (L) 3.90 - 12.70 K/uL    RBC 5.08 4.60 - 6.20 M/uL    Hemoglobin 14.3 14.0 - 18.0 g/dL    Hematocrit 43.6 40.0 - 54.0 %    MCV 86 82 - 98 fL    MCH 28.1 27.0 - 31.0 pg    MCHC 32.8 32.0 - 36.0 g/dL    RDW 14.1 11.5 - 14.5 %    Platelets 223 150 - 450 K/uL    MPV 10.4 9.2 - 12.9 fL    Lipid panel    Collection Time: 12/21/23  9:03 AM   Result Value Ref Range    Cholesterol 214 (H) 120 - 199 mg/dL    Triglycerides 101 30 - 150 mg/dL    HDL 52 40 - 75 mg/dL    LDL Cholesterol 141.8 63.0 - 159.0 mg/dL    HDL/Cholesterol Ratio 24.3 20.0 - 50.0 %    Total Cholesterol/HDL Ratio 4.1 2.0 - 5.0    Non-HDL Cholesterol 162 mg/dL   TSH    Collection Time: 12/21/23  9:03 AM   Result Value Ref Range    TSH 0.554 0.400 - 4.000 uIU/mL   Hemoglobin A1c    Collection Time: 12/21/23  9:03 AM   Result Value Ref Range    Hemoglobin A1C 5.9 (H) 4.0 - 5.6 %    Estimated Avg Glucose 123 68 - 131 mg/dL   Hepatitis C Antibody    Collection Time: 12/21/23  9:03 AM   Result Value Ref Range    Hepatitis C Ab Non-reactive Non-reactive       Latest Reference Range & Units 12/27/22 08:17   HIV 1/2 Ag/Ab Non-reactive  Non-reactive     CXR Dec 2022 Impression:  No acute process seen.    US Abdomen Complete Feb 2021 IMPRESSION: 1. Normal ultrasound of the abdomen.    US PELIVS LIMITED NON OB Oct 2018 IMPRESSION: 1. A reducible right inguinal hernia containing mesenteric fat is noted.    ASSESSMENT:     1. Annual physical exam    2. Healthcare maintenance    3. Class 1 obesity with body mass index (BMI) of 31.0 to 31.9 in adult, unspecified obesity type, unspecified whether serious comorbidity present    4. Hyperlipidemia, unspecified hyperlipidemia type    5. Pain in the groin, right      47 y.o. male PMHx GERD on omeprazole, Hiatal hernia presents for an annual exam in [facility].  Patient states that he is in normal state of health except for R groin discomfort that occurs intermittently. Home medications reviewed. Exam is relatively benign. No skin lesions suspicious for malignancy noted.   Immunization status: up to date and documented, missing doses of influenza or Tdap. Scheduled cancer screenings completed. The 10-year ASCVD risk score (Reuben SHAY, et al., 2019) is: 8.3%      MDM  Reviewed: previous chart, nursing  note and vitals  Reviewed previous: labs, x-ray, ultrasound and ECG  Interpretation: labs and ECG (Mildly low wbc, elevated chol otherwise relatively unremarkable CBC, Lipid Panel, CMP, TSH, HgA1C, Hep C, HIV)    His leukopenia is likely benign and his neutropenia resembles that of benign ethnic neutropenia.  PLAN:   We suggest periodic health maintenance visits annually or sooner with concerns.   Routine labs CMP, CBC, Lipid, HgA1C, TSH  Orders Placed This Encounter   Procedures    (In Office Administered) Tdap Vaccine   Influenza vaccination to be done at local pharmacy.  Continue current medications.   New Prescriptions    SIMVASTATIN (ZOCOR) 40 MG TABLET    Take 0.5 tablets (20 mg total) by mouth every evening.   Encouraged healthy eating including increasing fiber, exercise, weight management and avoidance of alcohol.   Recommend 150 minutes of moderate-intensity physical activity and 2 days of muscle strengthening activity weekly.  Recommend daily sun protection/avoidance, use of at least SPF 30, broad spectrum sunscreen (OTC drug), and routine physician surveillance to optimize early detection.  Follow up with Urology if continue to have concerns of inguinal hernia.    The results of physical exam findings and labs were reviewed with the patient. Management of above assessments/visit diagnoses was discussed with patient.   Precautions for return discussed at length. Patient was given ample time for questions. All questions asked and answered to the satisfaction of the patient. He was instructed to watch cautiously for s/s of infection and to seek prompt medical attention as his immune system may be slightly weakened. Patient is in agreement with the above and verbalized understanding. Total time spent caring for the patient today was 35 minutes. This includes time spent before the visit reviewing the chart, time spent during the visit, and time spent after the visit on documentation.            Fabrizio  MD Yaritza  [facility]

## 2023-12-21 NOTE — PROGRESS NOTES
"Nutrition Assessment  Session Time:  45 minutes      Client name:  Amador Brown  :  1975  Age:  47 y.o.  Gender:  male    Client states:  Very pleasant [employer] employee here for his annual [employer] physical.   with two sons, ages 14 and 20 (college) and resides in [place].  Recently built a new house and moved in a few months ago.  Has no active medical complaints at this time  Feels as though his habits have been somewhat different due to moving as his former apartment did not have the necessary cooking tools and equipment.  Nevertheless, recalls previous visit in which we discussed dietary modifications, which he has since incorporated.  Has been conscious of increasing vegetable intake with lunch and dinner, even when dining out.  Has also encouraged fellow colleagues to adopt the same habit.  Dines out often due to the nature of his job, inquiring about recommended dietary strategies when dining out.  Is conscious of overall PO intake, recalling recent preparation of ground turkey and beans nachos in addition to salmon and broccoli.  Inquired about other recommended dietary modifications.  Also, remains physically active, jogging ~20 miles weekly using home treadmill in addition to occasional weight training activities.  Understands the importance of such as he was previously advised to incorporate although frequency has been less due to move.  Plans to purchase weight training equipment for new home soon.  Both of his sons are fitness-oriented, sharing that his younger son plays football, and so, at times, they may exercise together.  Overall, wishes to remain proactive in managing health.    Anthropometrics  Height:  5' 8"     Weight:  203.6#  BMI:  31  % Body Fat:  34.2%    Clinical Signs/Symptoms  N/V/D:  None  Appetite:  good       Past Medical History:   Diagnosis Date    GERD (gastroesophageal reflux disease)     Hiatal hernia with GERD        Past Surgical History:   Procedure " Laterality Date    KNEE ARTHROPLASTY Left     1990 - torn cartilage    SINUS SURGERY Bilateral 2005       Medications    has a current medication list which includes the following prescription(s): levocetirizine, losartan, multivitamin, omeprazole-sodium bicarbonate, vitamin d, and zinc gluconate.    Vitamins, Minerals, and/or Supplements:  *Please see above     Food/Medication Interactions:  Reviewed     Food Allergies or Intolerances:  Eggs although tolerateS intake       Social History    Marital status:    Employment:  [employer]    Social History     Tobacco Use    Smoking status: Not on file    Smokeless tobacco: Not on file   Substance Use Topics    Alcohol use: Not on file        Lab Reports   Sodium   Date Value Ref Range Status   12/21/2023 144 136 - 145 mmol/L Final     Potassium   Date Value Ref Range Status   12/21/2023 4.5 3.5 - 5.1 mmol/L Final     Chloride   Date Value Ref Range Status   12/21/2023 107 95 - 110 mmol/L Final     CO2   Date Value Ref Range Status   12/21/2023 28 23 - 29 mmol/L Final     Glucose   Date Value Ref Range Status   12/21/2023 98 70 - 110 mg/dL Final     BUN   Date Value Ref Range Status   12/21/2023 14 6 - 20 mg/dL Final     Creatinine   Date Value Ref Range Status   12/21/2023 1.0 0.5 - 1.4 mg/dL Final     Calcium   Date Value Ref Range Status   12/21/2023 10.0 8.7 - 10.5 mg/dL Final     Total Protein   Date Value Ref Range Status   12/21/2023 7.8 6.0 - 8.4 g/dL Final     Albumin   Date Value Ref Range Status   12/21/2023 4.1 3.5 - 5.2 g/dL Final     Total Bilirubin   Date Value Ref Range Status   12/21/2023 0.6 0.1 - 1.0 mg/dL Final     Comment:     For infants and newborns, interpretation of results should be based  on gestational age, weight and in agreement with clinical  observations.    Premature Infant recommended reference ranges:  Up to 24 hours.............<8.0 mg/dL  Up to 48 hours............<12.0 mg/dL  3-5 days..................<15.0 mg/dL  6-29  days.................<15.0 mg/dL       Alkaline Phosphatase   Date Value Ref Range Status   12/21/2023 47 (L) 55 - 135 U/L Final     AST   Date Value Ref Range Status   12/21/2023 33 10 - 40 U/L Final     ALT   Date Value Ref Range Status   12/21/2023 31 10 - 44 U/L Final     Anion Gap   Date Value Ref Range Status   12/21/2023 9 8 - 16 mmol/L Final     eGFR    Date Value Ref Range Status   08/10/2022 >90 >90 mL/min/1.73m2 Final     eGFR   Date Value Ref Range Status   08/10/2022 >90 >90 mL/min/1.73m2 Final      Lab Results   Component Value Date    WBC 2.58 (L) 12/21/2023    HGB 14.3 12/21/2023    HCT 43.6 12/21/2023    MCV 86 12/21/2023     12/21/2023        Lab Results   Component Value Date    CHOL 214 (H) 12/21/2023     Lab Results   Component Value Date    HDL 52 12/21/2023     Lab Results   Component Value Date    LDLCALC 141.8 12/21/2023     Lab Results   Component Value Date    TRIG 101 12/21/2023     Lab Results   Component Value Date    CHOLHDL 24.3 12/21/2023     Lab Results   Component Value Date    HGBA1C 5.9 (H) 12/21/2023     BP Readings from Last 1 Encounters:   12/21/23 (!) 138/91       Food History  Breakfast:  [place] cereal with almond milk/oatmeal  Mid-morning Snack:  Low fat Greek yogurt  Lunch:  Restaurant meal  Mid-afternoon Snack:  +/- Skinny Pop popcorn  Dinner:  Homemade tacos with ground turkey, beans, and avocado/salmon filet + broccoli  Snack:  None  *Fluid intake:  Coffee, water, tea    Exercise History:  Treadmill 3-4x/week (~20 miles/week) + occasional weight training activities     Cultural/Spiritual/Personal Preferences:  None identified    Support System:  spouse and children    State of Change:  Action    Barriers to Change:  None    Diagnosis    Altered nutrition related laboratory values related to increased dining out as evidenced by LDL:  141.8; HbA1c:  5.9%.    Intervention    RMR (Method:  InBody):  1683 kcal  Activity Factor:   1.3     COLE:  2187 -  500 = 1687 kcal    Goals:  1.  LDL < 130; HbA1c < 5.7%  2.  Incorporate a source of lean protein with breakfast and mid-afternoon snack (examples discussed)  3.  Continue increased vegetable intake (1/2 plate) with lunch and dinner  4.  Continue current P.A. routine and efforts toward increased strength training frequency as tolerated    Nutrition Education  The following education was provided to the patient:  Complimented patient on proactive role in health maintenance.  Complimented patient on dietary compliance/modifications and resulting health improvements.  Complimented patient on physical activity efforts.  Discussed meal planning/USDA's My Plate design.  Discussed healthy snacking.   Suggested dietary modifications based on current dietary behaviors and individual food preferences.  Discussed macronutrient distribution among meals and snacks, including CHO, protein, and fat recommendations and its importance/benefits.  Discussed physical activity guidelines and its associated benefits.  Discussed tips for eating healthy when dining out.  *Lab results were pending at time of consult and so, not discussed with patient.    Patient verbalized understanding of nutrition education and recommendations received.    Handouts Provided  Meal Planning Guide  Eat Fit Shopping List  Eat Fit Alexia  Fueling Well On-The-Go    Monitoring/Evaluation    Monitor the following:  Weight  BMI  % Body Fat  Caloric intake  Lipid panel  HbA1c    Follow Up Plan:  Communication with referring healthcare provider is unnecessary at this time as patient presented as part of annual wellness exam.  However, will follow up with patient in 1-2 years.

## 2023-12-21 NOTE — PROGRESS NOTES
Subjective     Patient ID: Amador Brown is a 47 y.o. male.    Chief Complaint: No chief complaint on file.    HPI  Pt. Has no significant cardiovascular or pulmonary history. Pt. Has history of hypertension, which is managed by an ARB.     Physical Limitations: Pt. Deferred curl ups due to past hernia surgery, but was able to complete all additional fitness testing.     Current exercise routine:Patient currently runs averaging 20 miles/week, Pt. will run 2-5 miles on weekdays and 5-8 miles on the weekend.  Pt. does not follow a formal stretching or strengthening routine.      Goals: To start adding in strength training into his current routine.     Notes: Pt. Was friendly and engaged.  Pt. Is  with 2 sons, and just moved into a new house they build in [place]. Pt. Is knowledgeable about exercise, and stated he was doing strength but with the move it has been hard to keep into his routine but has stayed consistent with running. Discussed different ways to add in strength training, and he really liked the idea of using bands. Pt. Asked questions and was receptive to all recommendations.     Review of Systems  The fitness evaluation results are as follows:  D.O.S. 12/21/2023 12/27/2022   Height (in): 68 68   Weight (lbs): 203.6 206.9   BMI: 31.824332 31.966765   Body Fat (%): 34.20 31.90   Waist (cm): 89 89   RBP (mmHg): 128/86 140/98   RHR (bpm): 64 60    Strength Dominant (Lbs): 115 105    Strength Non Dominant (Lbs): 110 90   Push-up Assessment: 35 20   Curl-up Assessment: Deferred Deferred   Flexibility Testing (cm): 35 34   REE (kcals): 1683 1750        Objective     Physical Exam  Age/gender stratified assessment:  Assessment:    Resting BP: Within Normal Limits   Body Fat %: Poor   Waist Circumfernece: Low Risk    Strength Dominant: 75th    Strength Non Dominant: 75th   Upper Body Endurance: Excellent   Abdominal Endurance: Deferred   Lower body Flexibiltiy: Excellent        Assessment  and Plan     1. Routine general medical examination at a health care facility    Recommended fitness guidelines:    -150 minutes of moderate intensity aerobic exercise per week or 75 minutes of vigorous intensity aerobic exercise per week.    -2 to 4 days per week of resistance training for each muscle group. Practice upper and lower body resistance exercises with bands at home, as well as core exercises 2x/week.     -Daily stretching with a hold of at least 30 seconds per muscle group. Practice seated hamstring and seated piriformis stretches daily.

## 2023-12-22 ENCOUNTER — PATIENT MESSAGE (OUTPATIENT)
Dept: INTERNAL MEDICINE | Facility: CLINIC | Age: 48
End: 2023-12-22
Payer: COMMERCIAL

## 2023-12-22 PROBLEM — I10 HYPERTENSIVE DISORDER: Status: ACTIVE | Noted: 2021-12-07

## 2023-12-22 PROBLEM — E78.2 MIXED DYSLIPIDEMIA: Status: ACTIVE | Noted: 2020-10-21

## 2023-12-22 PROBLEM — H60.60 CHRONIC OTITIS EXTERNA: Status: ACTIVE | Noted: 2020-03-13

## 2023-12-22 PROBLEM — R51.9 HEADACHE: Status: ACTIVE | Noted: 2020-08-03

## 2023-12-22 PROBLEM — R09.81 CONGESTION OF NASAL SINUS: Status: ACTIVE | Noted: 2021-01-17

## 2023-12-22 PROBLEM — R05.9 COUGH IN ADULT: Status: ACTIVE | Noted: 2021-01-03

## 2023-12-22 PROBLEM — U07.1 COVID-19: Status: ACTIVE | Noted: 2021-01-03

## 2023-12-22 PROBLEM — K21.9 ACID REFLUX: Status: ACTIVE | Noted: 2021-12-07

## 2023-12-22 RX ORDER — SIMVASTATIN 40 MG/1
20 TABLET, FILM COATED ORAL NIGHTLY
Qty: 45 TABLET | Refills: 3 | Status: SHIPPED | OUTPATIENT
Start: 2023-12-22 | End: 2024-12-21

## 2024-03-05 ENCOUNTER — PATIENT MESSAGE (OUTPATIENT)
Dept: INTERNAL MEDICINE | Facility: CLINIC | Age: 49
End: 2024-03-05
Payer: COMMERCIAL

## 2024-03-05 DIAGNOSIS — I10 ESSENTIAL HYPERTENSION: ICD-10-CM

## 2024-03-05 RX ORDER — LOSARTAN POTASSIUM 25 MG/1
25 TABLET ORAL DAILY
Qty: 90 TABLET | Refills: 0 | Status: SHIPPED | OUTPATIENT
Start: 2024-03-05 | End: 2024-06-04 | Stop reason: SDUPTHER

## 2024-06-04 DIAGNOSIS — I10 ESSENTIAL HYPERTENSION: ICD-10-CM

## 2024-06-04 RX ORDER — LOSARTAN POTASSIUM 25 MG/1
25 TABLET ORAL DAILY
Qty: 90 TABLET | Refills: 3 | Status: SHIPPED | OUTPATIENT
Start: 2024-06-04 | End: 2025-06-04

## 2024-11-19 DIAGNOSIS — Z13.6 ENCOUNTER FOR SCREENING FOR CARDIOVASCULAR DISORDERS: Primary | ICD-10-CM

## 2024-12-23 ENCOUNTER — OFFICE VISIT (OUTPATIENT)
Dept: INTERNAL MEDICINE | Facility: CLINIC | Age: 49
End: 2024-12-23
Attending: EMERGENCY MEDICINE

## 2024-12-23 ENCOUNTER — CLINICAL SUPPORT (OUTPATIENT)
Dept: INTERNAL MEDICINE | Facility: CLINIC | Age: 49
End: 2024-12-23
Attending: EMERGENCY MEDICINE

## 2024-12-23 ENCOUNTER — HOSPITAL ENCOUNTER (OUTPATIENT)
Dept: RADIOLOGY | Facility: HOSPITAL | Age: 49
Discharge: HOME | End: 2024-12-23
Attending: EMERGENCY MEDICINE

## 2024-12-23 VITALS
WEIGHT: 215 LBS | HEART RATE: 72 BPM | DIASTOLIC BLOOD PRESSURE: 60 MMHG | SYSTOLIC BLOOD PRESSURE: 130 MMHG | HEIGHT: 68 IN | OXYGEN SATURATION: 94 % | BODY MASS INDEX: 32.58 KG/M2

## 2024-12-23 DIAGNOSIS — Z13.6 ENCOUNTER FOR SCREENING FOR CARDIOVASCULAR DISORDERS: ICD-10-CM

## 2024-12-23 DIAGNOSIS — Z00.00 ENCOUNTER FOR SCREENING AND PREVENTATIVE CARE: Primary | ICD-10-CM

## 2024-12-23 DIAGNOSIS — Z00.00 ROUTINE GENERAL MEDICAL EXAMINATION AT A HEALTH CARE FACILITY: Primary | ICD-10-CM

## 2024-12-23 DIAGNOSIS — E66.811 CLASS 1 OBESITY WITH BODY MASS INDEX (BMI) OF 32.0 TO 32.9 IN ADULT, UNSPECIFIED OBESITY TYPE, UNSPECIFIED WHETHER SERIOUS COMORBIDITY PRESENT: ICD-10-CM

## 2024-12-23 LAB
ALBUMIN SERPL BCP-MCNC: 4 G/DL (ref 3.5–5.2)
ALP SERPL-CCNC: 53 U/L (ref 40–150)
ALT SERPL W/O P-5'-P-CCNC: 42 U/L (ref 10–44)
ANION GAP SERPL CALC-SCNC: 12 MMOL/L (ref 8–16)
AST SERPL-CCNC: 39 U/L (ref 10–40)
BILIRUB SERPL-MCNC: 0.6 MG/DL (ref 0.1–1)
BUN SERPL-MCNC: 13 MG/DL (ref 6–20)
CALCIUM SERPL-MCNC: 10 MG/DL (ref 8.7–10.5)
CHLORIDE SERPL-SCNC: 107 MMOL/L (ref 95–110)
CHOLEST SERPL-MCNC: 220 MG/DL (ref 120–199)
CHOLEST/HDLC SERPL: 4.8 {RATIO} (ref 2–5)
CO2 SERPL-SCNC: 27 MMOL/L (ref 23–29)
CREAT SERPL-MCNC: 1.1 MG/DL (ref 0.5–1.4)
ERYTHROCYTE [DISTWIDTH] IN BLOOD BY AUTOMATED COUNT: 14.3 % (ref 11.5–14.5)
EST. GFR  (NO RACE VARIABLE): >60 ML/MIN/1.73 M^2
ESTIMATED AVG GLUCOSE: 120 MG/DL (ref 68–131)
GLUCOSE SERPL-MCNC: 110 MG/DL (ref 70–110)
HBA1C MFR BLD: 5.8 % (ref 4–5.6)
HCT VFR BLD AUTO: 42.8 % (ref 40–54)
HDLC SERPL-MCNC: 46 MG/DL (ref 40–75)
HDLC SERPL: 20.9 % (ref 20–50)
HGB BLD-MCNC: 13.7 G/DL (ref 14–18)
LDLC SERPL CALC-MCNC: 153 MG/DL (ref 63–159)
MCH RBC QN AUTO: 27.4 PG (ref 27–31)
MCHC RBC AUTO-ENTMCNC: 32 G/DL (ref 32–36)
MCV RBC AUTO: 86 FL (ref 82–98)
NONHDLC SERPL-MCNC: 174 MG/DL
PLATELET # BLD AUTO: 203 K/UL (ref 150–450)
PMV BLD AUTO: 10.2 FL (ref 9.2–12.9)
POTASSIUM SERPL-SCNC: 4.4 MMOL/L (ref 3.5–5.1)
PROT SERPL-MCNC: 7.6 G/DL (ref 6–8.4)
RBC # BLD AUTO: 5 M/UL (ref 4.6–6.2)
SODIUM SERPL-SCNC: 146 MMOL/L (ref 136–145)
TRIGL SERPL-MCNC: 105 MG/DL (ref 30–150)
TSH SERPL DL<=0.005 MIU/L-ACNC: 0.84 UIU/ML (ref 0.4–4)
WBC # BLD AUTO: 2.95 K/UL (ref 3.9–12.7)

## 2024-12-23 PROCEDURE — 80053 COMPREHEN METABOLIC PANEL: CPT | Performed by: EMERGENCY MEDICINE

## 2024-12-23 PROCEDURE — 75571 CT HRT W/O DYE W/CA TEST: CPT | Mod: TC

## 2024-12-23 PROCEDURE — 75571 CT HRT W/O DYE W/CA TEST: CPT | Mod: 26,,, | Performed by: RADIOLOGY

## 2024-12-23 PROCEDURE — 99999 PR PBB SHADOW E&M-EST. PATIENT-LVL III: CPT | Mod: PBBFAC,,, | Performed by: EMERGENCY MEDICINE

## 2024-12-23 PROCEDURE — 80061 LIPID PANEL: CPT | Performed by: EMERGENCY MEDICINE

## 2024-12-23 PROCEDURE — 99999 PR PBB SHADOW E&M-EST. PATIENT-LVL I: CPT | Mod: PBBFAC,,,

## 2024-12-23 PROCEDURE — 83036 HEMOGLOBIN GLYCOSYLATED A1C: CPT | Performed by: EMERGENCY MEDICINE

## 2024-12-23 PROCEDURE — 97750 PHYSICAL PERFORMANCE TEST: CPT | Mod: ,,, | Performed by: INTERNAL MEDICINE

## 2024-12-23 PROCEDURE — 84443 ASSAY THYROID STIM HORMONE: CPT | Performed by: EMERGENCY MEDICINE

## 2024-12-23 PROCEDURE — 99395 PREV VISIT EST AGE 18-39: CPT | Mod: ,,, | Performed by: EMERGENCY MEDICINE

## 2024-12-23 PROCEDURE — 85027 COMPLETE CBC AUTOMATED: CPT | Performed by: EMERGENCY MEDICINE

## 2024-12-23 NOTE — PROGRESS NOTES
Pt. Has no significant cardiovascular or pulmonary history. Patient does have hx of HTN which is managed by an ARB.     Physical Limitations: Patient has hx of hiatal hernia and his GI Dr recommends against exercises laying on ground; curl-up test was deferred today.     Current exercise routine: Patient currently works out almost every day. He will run every other day and tries to do weights after the run or on days he doesn't run. He does stretch after running. He travels a lot for work but is active while at work.     Goals: Maintain muscle mass but lean out more.     Notes:  Patient was friendly and engaged. Patient was seen today for annual fitness consult. Since last year's visit, he's made a lot of changes to his workout. He increased his strength training routine and cut back on some of his running. Since he likes running, we discussed building some miles back in, but trying out Zone 2 training to reduce risk for injury, in addition to some other benefits. Patient asked questions and was receptive to all recommendations.      The fitness evaluation results are as follows:    D.O.S. 12/23/2024 12/21/2023 12/27/2022   Height (in): 69 68 68   Weight (lbs): 215.9 203.6 206.9   BMI: 31.371515 31.758877 31.865980   Body Fat (%): 32.60 34.20 31.90   Waist (cm): 95 89 89   RBP (mmHg): 130/60 128/86 140/98   RHR (bpm): 72 64 60    Strength Dominant (Lbs): 120 115 105    Strength Non Dominant (Lbs): 120 110 90   Push-up Assessment: 50 35 20   Curl-up Assessment: Deferred Deferred Deferred   Flexibility Testing (cm): 35 35 34   REE (kcals): 1796 1683 1750       Age/gender stratified assessment:    Resting BP: Within Normal Limits   Body Fat %: Poor   Waist Circumfernece: Low Risk    Strength Dominant: 75th    Strength Non Dominant: 75th   Upper Body Endurance: Excellent   Abdominal Endurance: Deferred   Lower body Flexibiltiy: Excellent       Recommended fitness guidelines:    -150 minutes of moderate  intensity aerobic exercise per week or 75 minutes of vigorous intensity aerobic exercise per week.    -2 to 4 days per week of resistance training for each muscle group.      -Daily stretching with a hold of at least 30 seconds per muscle group.

## 2024-12-23 NOTE — PROGRESS NOTES
[facility]      TODAY'S Date 12/23/2024  Patient ID: Amador Brown is a 49 y.o. male   MRN: [id number]  Primary Care Physician (PCP):  Unable, To Obtain    SUBJECTIVE       Chief Complaint:   Chief Complaint   Patient presents with    [facility]     Now using a CPAP, otherwise well      HPI:   Reviewed medical, surgical, social and family history, medications, appropriate preventive health screenings, as well as vaccination history. Updates as noted below or in assessment and plan.    This is a very pleasant 49 y.o. nonsmoking male with PMHx hiatal hernia with GERD. He is presenting to establish care in [facility].  The patient is currently in good health.     DIET AND LIFESTYLE:  He is considering becoming pescatarian with a preference for seafood. He reports difficulty maintaining healthy eating habits due to work-related entertaining, which involves irregular meal times and four-course meals. He frequently consumes late-night snacks and drinks, including wine and hors d'oeuvres, during work events. He reports increased alcohol consumption in recent weeks due to attending [employer] parties.    EXERCISE:  He has gained muscle mass through weight training and running. He omits leg-specific exercises as the resulting soreness interferes with his running routine. Colonoscopy was done [place] in Feb 2024 at [facility] [place].    MEDICAL HISTORY:  He underwent a colonoscopy in February 2024 with one polyp removed.    FAMILY HISTORY:  His father had diabetes, and his mother possibly had diabetes related to pancreatic cancer.      A review of medical records indicates patient has seen the following specialists:   Hem/Onc - Kristi Chadwick NP  for leukopenia  GI - Felisha Maravilla NP     ROS:  General: -fever, -chills, -fatigue, -weight gain, -weight loss  Eyes: -vision changes, -redness, -discharge  ENT: -ear pain, -nasal congestion, -sore  throat  Cardiovascular: -chest pain, -palpitations, -lower extremity edema  Respiratory: -cough, -shortness of breath  Gastrointestinal: -abdominal pain, -nausea, -vomiting, -diarrhea, -constipation, -blood in stool  Genitourinary: -dysuria, -hematuria, -frequency  Musculoskeletal: -joint pain, -muscle pain  Skin: -rash, -lesion  Neurological: -headache, -dizziness, -numbness, -tingling  Psychiatric: -anxiety, -depression, -sleep difficulty           Immunization History   Administered Date(s) Administered    COVID-19, MRNA, LN-S, PF (Pfizer) (Purple Cap) 03/09/2021, 03/30/2021, 11/10/2021    Tdap 12/21/2023     Past Medical History:   Diagnosis Date    GERD (gastroesophageal reflux disease)     Hiatal hernia with GERD     Hypertension 08/2021     Past Surgical History:   Procedure Laterality Date    KNEE ARTHROPLASTY Left     1990 - torn cartilage    SINUS SURGERY Bilateral 2005     Family History   Problem Relation Name Age of Onset    Diabetes Mother Blessing Brown     Cancer Mother Blessing Brown 46        pancreas    Diabetes Father Gilberto Brown     Ovarian cancer Maternal Grandmother 78     Hypertension Maternal Grandmother 78     Cancer Maternal Grandmother 78 52        ovarian    Heart disease Maternal Grandfather 88 55        heart attack    Colon cancer Maternal Grandfather 88     Cancer Maternal Grandfather 88     Cancer Maternal Uncle          great uncle /prostate and 1 with colon     Social History     Tobacco Use    Smoking status: Passive Smoke Exposure - Never Smoker    Smokeless tobacco: Never    Tobacco comments:     Occasional cigar   Substance Use Topics    Alcohol use: Yes     Alcohol/week: 3.0 standard drinks of alcohol     Types: 3 Drinks containing 0.5 oz of alcohol per week    Drug use: Never     Past Medical, Surgical and Social history reviewed and verified by me.     Review of patient's allergies indicates:   Allergen Reactions    Eggs [egg derived]      Current Outpatient Medications on File  "Prior to Visit   Medication Sig Dispense Refill    levocetirizine (XYZAL) 5 MG tablet Take 5 mg by mouth.      losartan (COZAAR) 25 MG tablet Take 1 tablet (25 mg total) by mouth once daily. 90 tablet 3    multivitamin (THERAGRAN) per tablet Take 1 tablet by mouth once daily.      omeprazole-sodium bicarbonate (ZEGERID) 40-1.1 mg-gram per capsule Take 1 capsule by mouth once daily.      simvastatin (ZOCOR) 40 MG tablet Take 0.5 tablets (20 mg total) by mouth every evening. 45 tablet 3    vitamin D (VITAMIN D3) 1000 units Tab Take 2,000 Units by mouth.      zinc gluconate 50 mg tablet Take 50 mg by mouth.       No current facility-administered medications on file prior to visit.       OBJECTIVE     PHYSICAL EXAM  Vitals:    12/23/24 1008   BP: 130/60   BP Location: Right arm   Patient Position: Sitting   Pulse: 72   SpO2: (!) 94%   Weight: 97.5 kg (215 lb)   Height: 5' 8" (1.727 m)     Vital Signs (Most Recent):  Pulse: 72 (12/23/24 1008)  BP: 130/60 (12/23/24 1008)  SpO2: (!) 94 % (12/23/24 1008)   Weight:   Wt Readings from Last 1 Encounters:   12/23/24 97.5 kg (215 lb)     Body mass index is 32.69 kg/m².      Vital signs and nursing assessment noted: recheck vitals during interview 67-72 bpm. SpO2: 96% on air.     GEN:   NAD, A & Ox3, atraumatic, well appearing, nontoxic appearing  HEENT:  PERRLA, EOMI, moist membranes, nl conjunctiva, no scleral icterus, no nystagmus, no nodes/nodules, soft, supple, FROM, no trachial deviation, nexus negative, normal pharynx, normal TMs bilaterally  CV:   2+ radial pulses, <2sec cap refill, no obvious JVD  RESP:  No obvious wheezing or stridor, equal and bilateral chest rise, no respiratory distress  ABD:   soft, Nontender, Nondistended,  no guarding/rebound  :   Deferred  BACK:  FROM, no midline tenderness, no paraspinal tenderness  EXT:   FROM, CORRIGAN x 4, no swelling, no edema, no calf tenderness, no bony tenderness, no warmth or redness, no crepitus, no obvious " deformity  LYMPH:  no gross adenopathy  NEURO:  GCS 15, CN II-XII grossly intact, no obvious motor/sensory deficit, no tremor, negative Romberg, nl gait/coordination  PSYCH:   Cooperative, no SI/HI, no anxiety, nl mood/affect, nl judgement/thought process  SKIN:  no rashes/lesions or masses, nl color, no pallor, warm, dry, intact    Tests      Results for orders placed or performed during the hospital encounter of 12/21/23   EKG 12-lead    Collection Time: 12/21/23  9:10 AM    Narrative    Test Reason : Z00.00,    Vent. Rate : 055 BPM     Atrial Rate : 055 BPM     P-R Int : 178 ms          QRS Dur : 080 ms      QT Int : 366 ms       P-R-T Axes : 066 007 007 degrees     QTc Int : 350 ms    Sinus bradycardia with sinus arrhythmia  Otherwise normal ECG  When compared with ECG of 27-DEC-2022 08:48,  No significant change was found  Confirmed by Katherine Garcia MD ([id number]) on 12/21/2023 11:08:23 AM    Referred By: ROSALINDA GUALLPA           Confirmed By:Katherine Garcia MD      Labs reviewed and independently interpreted. Imaging studies reviewed.   Recent Results (from the past 12 weeks)   Comprehensive metabolic panel    Collection Time: 12/23/24  8:05 AM   Result Value Ref Range    Sodium 146 (H) 136 - 145 mmol/L    Potassium 4.4 3.5 - 5.1 mmol/L    Chloride 107 95 - 110 mmol/L    CO2 27 23 - 29 mmol/L    Glucose 110 70 - 110 mg/dL    BUN 13 6 - 20 mg/dL    Creatinine 1.1 0.5 - 1.4 mg/dL    Calcium 10.0 8.7 - 10.5 mg/dL    Total Protein 7.6 6.0 - 8.4 g/dL    Albumin 4.0 3.5 - 5.2 g/dL    Total Bilirubin 0.6 0.1 - 1.0 mg/dL    Alkaline Phosphatase 53 40 - 150 U/L    AST 39 10 - 40 U/L    ALT 42 10 - 44 U/L    eGFR >60.0 >60 mL/min/1.73 m^2    Anion Gap 12 8 - 16 mmol/L   CBC Without Differential    Collection Time: 12/23/24  8:05 AM   Result Value Ref Range    WBC 2.95 (L) 3.90 - 12.70 K/uL    RBC 5.00 4.60 - 6.20 M/uL    Hemoglobin 13.7 (L) 14.0 - 18.0 g/dL    Hematocrit 42.8 40.0 - 54.0 %    MCV 86 82 - 98 fL    MCH 27.4  27.0 - 31.0 pg    MCHC 32.0 32.0 - 36.0 g/dL    RDW 14.3 11.5 - 14.5 %    Platelets 203 150 - 450 K/uL    MPV 10.2 9.2 - 12.9 fL   Lipid panel    Collection Time: 12/23/24  8:05 AM   Result Value Ref Range    Cholesterol 220 (H) 120 - 199 mg/dL    Triglycerides 105 30 - 150 mg/dL    HDL 46 40 - 75 mg/dL    LDL Cholesterol 153.0 63.0 - 159.0 mg/dL    HDL/Cholesterol Ratio 20.9 20.0 - 50.0 %    Total Cholesterol/HDL Ratio 4.8 2.0 - 5.0    Non-HDL Cholesterol 174 mg/dL   TSH    Collection Time: 12/23/24  8:05 AM   Result Value Ref Range    TSH 0.844 0.400 - 4.000 uIU/mL   Hemoglobin A1c    Collection Time: 12/23/24  8:05 AM   Result Value Ref Range    Hemoglobin A1C 5.8 (H) 4.0 - 5.6 %    Estimated Avg Glucose 120 68 - 131 mg/dL       Latest Reference Range & Units 12/21/23 09:03   Hepatitis C Ab Non-reactive  Non-reactive      Latest Reference Range & Units 12/27/22 08:17   HIV 1/2 Ag/Ab Non-reactive  Non-reactive       CT CALCIUM SCORING CARDIAC Dec 2024 Impression:  Agatston calcium score equals 0, which translates to the 0-25th percentile for coronary calcium in this 48-year-old man.     US ABDOMEN COMPLETE Feb 2021 Impression 1. Normal ultrasound of the abdomen.      ASSESSMENT/PLAN:   MDM  Reviewed: nursing note, vitals and previous chart  Reviewed previous: labs, ECG, CT scan and ultrasound  Interpretation: labs (Elevated HgA1c, sodium, cholesterol with low HDL and WBC otherwise relatively unremarkable CMP,  Lipid Panel, CBC, TSH, HIV, Hep C ab, PSA)         Health Maintenance   Topic Date Due    Colorectal Cancer Screening  Never done    Influenza Vaccine (1) Never done    COVID-19 Vaccine (4 - 2024-25 season) 09/01/2024    Hemoglobin A1c (Prediabetes)  12/23/2025    TETANUS VACCINE  12/21/2033    RSV Vaccine (Age 60+ and Pregnant patients) (1 - 1-dose 75+ series) 12/30/2051    Hepatitis C Screening  Completed    HIV Screening  Completed    Pneumococcal Vaccines (Age 0-49)  Joaquin English was seen today  for [employer] [facility] [employer] [facility].    Diagnoses and all orders for this visit:    Encounter for screening and preventative care    Class 1 obesity with body mass index (BMI) of 32.0 to 32.9 in adult, unspecified obesity type, unspecified whether serious comorbidity present     Assessment & Plan    IMPRESSION:  - Assessed patient's recent fitness progress, noting increased muscle mass and improved strength.  - Evaluated cholesterol levels, which remain slightly elevated. Awaiting cardiac CT results to determine if cholesterol medication is necessary.  - Considered pre-diabetic status based on HbA1c, improved slightly from last year. No medication needed at this time given family history and increased muscle mass.  - Reviewed low white blood cell count and hemoglobin; deemed normal for patient if asymptomatic.  - Noted slightly elevated sodium, likely due to dehydration; kidney function normal.    - Explained that muscle weighs more than fat, affecting scale readings but not necessarily indicating unhealthy weight gain.  - Discussed the importance of HDL cholesterol and foods that can increase it (avocados, salmon, olive oil).  - Advised against using Q-tips for ear cleaning, as they can push wax further into the ear canal.  - Informed about OTC earwax looseners as a safe alternative for ear cleaning.    - Mr. Brown to increase water intake to address slightly elevated sodium levels.  - Incorporate more foods elevated in HDL cholesterol (avocados, salmon, olive oil) into diet.  - Continue current exercise regimen, including weight training and running.  - Work on improving leg strength through targeted exercises.  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  - Maintain consistent eating schedule, avoiding late-night snacking.  - Use sunscreen regularly when outdoors, especially at the pool and beach.    The results of physical exam findings, labs, and imaging were reviewed with  the patient. Management of above assessments/visit diagnoses was discussed with patient. Precautions for return discussed at length. Patient was given ample time for questions. All questions asked and answered to the satisfaction of the patient. Patient is in agreement with the above and verbalized understanding. Total time spent caring for the patient today was 30 minutes. This includes time spent before the visit reviewing the chart, time spent during the visit, and time spent after the visit on documentation.    Fabrizio Vigil MD  [facility]    Disclaimer: This document was created using voice recognition software (M*Modal Fluency Direct). Although it may be edited, this document may contain errors related to incorrect recognition of the spoken word. Please contact the physician if clarification is needed.

## 2025-01-13 ENCOUNTER — NURSE TRIAGE (OUTPATIENT)
Dept: ADMINISTRATIVE | Facility: CLINIC | Age: 50
End: 2025-01-13
Payer: COMMERCIAL

## 2025-01-13 NOTE — TELEPHONE ENCOUNTER
Pt take Losartan 25 mg daily for high blood pressure. He bought a bp machine last week and bp has been elevated. From time to time he gets slight headaches. Pt stated bp has been elevated every day for the last week diastolic has been over 100. Bp this am is 142/101. Care advice recommends pt see MD within 3 days. Pt offered and accepted [facility] Virtual Visit. Please call pt with additional care advice.   Reason for Disposition   Systolic BP  >= 160 OR Diastolic >= 100    Additional Information   Negative: Difficult to awaken or acting confused (e.g., disoriented, slurred speech)   Negative: SEVERE difficulty breathing (e.g., struggling for each breath, speaks in single words)   Negative: [1] Weakness of the face, arm or leg on one side of the body AND [2] new-onset   Negative: [1] Numbness (i.e., loss of sensation) of the face, arm or leg on one side of the body AND [2] new-onset   Negative: [1] Chest pain lasts > 5 minutes AND [2] history of heart disease (i.e., heart attack, bypass surgery, angina, angioplasty, CHF)   Negative: [1] Chest pain AND [2] took nitroglycerin AND [3] pain was not relieved   Negative: Sounds like a life-threatening emergency to the triager   Negative: [1] Systolic BP  >= 160 OR Diastolic >= 100 AND [2] cardiac (e.g., breathing difficulty, chest pain) or neurologic symptoms (e.g., new-onset blurred or double vision, unsteady gait)   Negative: [1] Systolic BP  >= 200 OR Diastolic >= 120 AND [2] having NO cardiac or neurologic symptoms   Negative: [1] Systolic BP  >= 180 OR Diastolic >= 110 AND [2] missed most recent dose of blood pressure medication   Negative: Systolic BP  >= 180 OR Diastolic >= 110   Negative: Ran out of BP medications    Protocols used: [facility]